# Patient Record
Sex: FEMALE | Race: WHITE | URBAN - METROPOLITAN AREA
[De-identification: names, ages, dates, MRNs, and addresses within clinical notes are randomized per-mention and may not be internally consistent; named-entity substitution may affect disease eponyms.]

---

## 2018-09-05 ENCOUNTER — OFFICE VISIT (OUTPATIENT)
Dept: FAMILY MEDICINE CLINIC | Age: 38
End: 2018-09-05

## 2018-09-05 VITALS
RESPIRATION RATE: 18 BRPM | HEART RATE: 67 BPM | WEIGHT: 188 LBS | DIASTOLIC BLOOD PRESSURE: 82 MMHG | SYSTOLIC BLOOD PRESSURE: 124 MMHG | TEMPERATURE: 98.9 F | HEIGHT: 71 IN | BODY MASS INDEX: 26.32 KG/M2 | OXYGEN SATURATION: 100 %

## 2018-09-05 DIAGNOSIS — G47.00 INSOMNIA, UNSPECIFIED TYPE: ICD-10-CM

## 2018-09-05 DIAGNOSIS — F90.9 ATTENTION DEFICIT HYPERACTIVITY DISORDER (ADHD), UNSPECIFIED ADHD TYPE: Primary | ICD-10-CM

## 2018-09-05 RX ORDER — CHOLECALCIFEROL (VITAMIN D3) 125 MCG
CAPSULE ORAL
COMMUNITY

## 2018-09-05 RX ORDER — DEXTROAMPHETAMINE SACCHARATE, AMPHETAMINE ASPARTATE, DEXTROAMPHETAMINE SULFATE AND AMPHETAMINE SULFATE 5; 5; 5; 5 MG/1; MG/1; MG/1; MG/1
20 TABLET ORAL 3 TIMES DAILY
Qty: 90 TAB | Refills: 0 | Status: SHIPPED | OUTPATIENT
Start: 2018-09-05 | End: 2018-10-04 | Stop reason: SDUPTHER

## 2018-09-05 NOTE — PROGRESS NOTES
HISTORY OF PRESENT ILLNESS  Corie Edmond is a 41 y.o. female. HPI  Corie Edmond is a 41 y.o. female who presents to the office today for med refills. She is a new patient. She comes in today initially to establish care, but says this office is too far from home. She has a long hx of ADD, anxiety, insomnia. Her last PCP was with [facility]. When she left, she felt like she was seeing a new provider each visit, and they would change her medication regimen each time. She eventually left and started seeing a psychiatrist in [facility]. He was treating her ADD with adderall 20mg TID. He was also giving her valium to take prior to each adderall dose. She has insomnia. She has tried many different medications for this: Nankin Pillar, sonata, trazodone, xanax, clonidine, seroquel, melatonin. Nothing seemed to help. She has been having sleeping issues as a child. Her sister tried to commit suicide when she was younger, and always attempted at night. So her mother used to tell her to keep an eye on her sister, and thus she never was able to sleep. She does drink energy drinks, especially when she does not have adderall. Today she is drinking a Monster energy drink in the room. She does not exercise. Chief Complaint   Patient presents with   [facility] Establish Care     Current Outpatient Prescriptions   Medication Sig    melatonin tab tablet Take  by mouth nightly.  dextroamphetamine-amphetamine (ADDERALL) 20 mg tablet Take 1 Tab (20 mg total) by mouth three (3) times dailyEarliest Fill Date: 9/5/18. Max Daily Amount: 60 mg    suvorexant (BELSOMRA) 10 mg tablet Take 1 tablet 30 minutes before bedtime. Max dose 10mg daily     No current facility-administered medications for this visit.         Not on File  Past Medical History:   Diagnosis Date    Depression     Headache     Knee joint pain      History   Smoking Status    Current Every Day Smoker    Packs/day: 1.00    Types: Cigarettes Smokeless Tobacco    Never Used     History   Alcohol Use No     Family History   Problem Relation Age of Onset    Depression Mother     Anxiety Mother     Hypertension Mother     Stroke Mother     Cancer Father      skin, prostate    Depression Sister     Alzheimer Maternal Grandmother     Stroke Maternal Grandmother     Cancer Other      Review of Systems   Constitutional: Negative for fever, malaise/fatigue and weight loss. Cardiovascular: Negative for chest pain and palpitations. Neurological: Negative for dizziness and headaches. Psychiatric/Behavioral: Negative for substance abuse. The patient is nervous/anxious and has insomnia. Visit Vitals    /82 (BP 1 Location: Left arm, BP Patient Position: Sitting)    Pulse 67    Temp 98.9 °F (37.2 °C) (Oral)    Resp 18    Ht 5' 11\" (1.803 m)    Wt 188 lb (85.3 kg)    LMP 08/13/2018    SpO2 100%    BMI 26.22 kg/m2     Physical Exam   Constitutional: She is oriented to person, place, and time. She appears well-developed and well-nourished. No distress. Cardiovascular: Normal rate, regular rhythm and normal heart sounds. Pulmonary/Chest: Effort normal and breath sounds normal. No respiratory distress. She has no wheezes. Neurological: She is alert and oriented to person, place, and time. Psychiatric: She has a normal mood and affect. Her speech is normal and behavior is normal. Thought content normal. She expresses no homicidal and no suicidal ideation. Nursing note and vitals reviewed. ASSESSMENT and PLAN    ICD-10-CM ICD-9-CM    1. Attention deficit hyperactivity disorder (ADHD), unspecified ADHD type F90.9 314.01 dextroamphetamine-amphetamine (ADDERALL) 20 mg tablet   2. Insomnia, unspecified type G47.00 780.52 suvorexant (BELSOMRA) 10 mg tablet      Reviewed . Last rx for Adderall 20mg tabs, #90 was filled on 8/9/2018. This has been prescribed consistently over the last year+. I will refill adderall as written. She will follow up with CHRISTAL Reeder. She would prefer the office in [place] which is closer for her. I am not refilling valium. Will try belsomra for insomnia- cost may be an issue since she is paying out of pocket. If this is too expensive, I can send over a rx for hydroxyzine which may help with her anxiety prior to bedtime. Reviewed medication and side effects. Patient agrees with the plan and verbalizes understanding. Follow-up Disposition:  Return in about 1 month (around 10/5/2018) for ADD and insomnia with new PCP.     Fady Honeycutt PA-C  9/5/2018

## 2018-09-05 NOTE — PROGRESS NOTES
Taniya Rojo is a 41 y.o. female presented in clinic today for   Chief Complaint   Patient presents with   [address]     1. Have you been to the ER, urgent care clinic since your last visit? Hospitalized since your last visit? No    2. Have you seen or consulted any other health care providers outside of the [address] [place] [address] [place] since your last visit? Include any pap smears or colon screening.  No

## 2018-09-05 NOTE — MR AVS SNAPSHOT
[address] [place] [address] [address] [address] 
[phone] Patient: Ken Jeong MRN: [id number] [id number] Visit Information Date & Time Provider Department Dept. Phone Encounter #  
 9/5/2018 11:00 AM Jovani King PA-C [employer] [phone] [id number] Follow-up Instructions Return for ADD and insomnia with new PCP. Upcoming Health Maintenance Date Due DTaP/Tdap/Td series (1 - Tdap) 7/7/2003 PAP AKA CERVICAL CYTOLOGY 7/7/2003 Influenza Age 5 to Adult 8/1/2018 Allergies as of 9/5/2018  Review Complete On: 9/5/2018 By: Jovani King PA-C Not on File Current Immunizations  Never Reviewed No immunizations on file. Not reviewed this visit You Were Diagnosed With   
  
 Codes Comments Attention deficit hyperactivity disorder (ADHD), unspecified ADHD type    -  Primary ICD-10-CM: F90.9 ICD-9-CM: 314.01 Insomnia, unspecified type     ICD-10-CM: G47.00 ICD-9-CM: 780.52 Vitals BP Pulse Temp Resp Height(growth percentile) Weight(growth percentile) 124/82 (BP 1 Location: Left arm, BP Patient Position: Sitting) 67 98.9 °F (37.2 °C) (Oral) 18 5' 11\" (1.803 m) 188 lb (85.3 kg) LMP SpO2 BMI OB Status Smoking Status 08/13/2018 100% 26.22 kg/m2 Having regular periods Current Every Day Smoker Vitals History BMI and BSA Data Body Mass Index Body Surface Area  
 26.22 kg/m 2 2.07 m 2 Your Updated Medication List  
  
   
This list is accurate as of 9/5/18 11:35 AM.  Always use your most recent med list.  
  
  
  
  
 dextroamphetamine-amphetamine 20 mg tablet Commonly known as:  ADDERALL Take 1 Tab (20 mg total) by mouth three (3) times dailyEarliest Fill Date: 9/5/18. Max Daily Amount: 60 mg  
  
 melatonin Tab tablet Take  by mouth nightly. suvorexant 10 mg tablet Commonly known as:  Jose Bates Take 1 tablet 30 minutes before bedtime. Max dose 10mg daily Prescriptions Printed Refills  
 dextroamphetamine-amphetamine (ADDERALL) 20 mg tablet 0 Sig: Take 1 Tab (20 mg total) by mouth three (3) times dailyEarliest Fill Date: 9/5/18. Max Daily Amount: 60 mg  
 Class: Print Route: Oral  
 suvorexant (BELSOMRA) 10 mg tablet 1 Sig: Take 1 tablet 30 minutes before bedtime. Max dose 10mg daily Class: Print Follow-up Instructions Return for ADD and insomnia with new PCP. Patient Instructions CHRISTAL Britton [address], [place], [address] Phone: [phone] Suvorexant (By mouth) Suvorexant (ddf-nly-PCG-ant) Treats insomnia. Brand Name(s): Belsomra There may be other brand names for this medicine. When This Medicine Should Not Be Used: This medicine is not right for everyone. Do not use it if you have narcolepsy. How to Use This Medicine:  
Tablet · Your doctor will tell you how much medicine to use. Do not use more than directed. · You should not take this medicine if you are not able to sleep or rest for at least 7 hours before you need to be active again. · This medicine works better if you do not take it with food or right after a meal. 
· This medicine should come with a Medication Guide. Ask your pharmacist for a copy if you do not have one. · Missed dose: This medicine is not taken on a regular schedule. Use it only when you cannot sleep. · Store the medicine in a closed container at room temperature, away from heat, moisture, and direct light. Do not remove the tablets from the blister pack until you are ready to use them. Drugs and Foods to Avoid: Ask your doctor or pharmacist before using any other medicine, including over-the-counter medicines, vitamins, and herbal products. · Some foods and medicines can affect how suvorexant works. Tell your doctor if you are using any of the following: ¨ Aprepitant, boceprevir, carbamazepine, ciprofloxacin, clarithromycin, conivaptan, digoxin, diltiazem, erythromycin, fluconazole, imatinib, nefazodone, phenytoin, rifampin, telaprevir, telithromycin, verapamil ¨ Medicine to treat HIV (such as amprenavir, atazanavir, fosamprenavir, indinavir, nelfinavir, ritonavir, saquinavir) or medicine to treat a fungal infection (such as itraconazole, ketoconazole, posaconazole) · Tell your doctor if you use anything else that makes you sleepy. Some examples are allergy medicine, narcotic pain medicine, and alcohol. · Do not eat grapefruit or drink grapefruit juice while you are using this medicine. Warnings While Using This Medicine: · Tell your doctor if you are pregnant or breastfeeding, or if you have liver disease, breathing or lung problems (such as COPD, sleep apnea), or muscle problems or weakness. Tell your doctor if you have a history of alcohol or drug addiction, depression, or mental illness. · This medicine may cause the following problems: ¨ Unusual changes in mood or behavior ¨ Sleep paralysis (while you are going to sleep or waking up) · This medicine may make you drowsy the next morning. Do not drive or do anything else that could be dangerous until you know how this medicine affects you. · This medicine may cause you to do things while you are still asleep, such as driving or eating. You may not remember doing these things the next morning. Tell your doctor right away if you learn that this has happened. · Call your doctor if you still have trouble sleeping after you take this medicine for 7 to 10 days. · This medicine can be habit-forming. Do not use more than your prescribed dose. Call your doctor if you think your medicine is not working. · Keep all medicine out of the reach of children. Never share your medicine with anyone. Possible Side Effects While Using This Medicine: Call your doctor right away if you notice any of these side effects: · Allergic reaction: Itching or hives, swelling in your face or hands, swelling or tingling in your mouth or throat, chest tightness, trouble breathing · Anxiety, depression, nervousness, unusual behavior, or thoughts of hurting yourself · Memory loss · Seeing, hearing, or feeling things that are not there · Severe confusion, drowsiness, muscle weakness · Temporary inability to move or talk while you are going to sleep or waking up If you notice these less serious side effects, talk with your doctor: · Daytime drowsiness If you notice other side effects that you think are caused by this medicine, tell your doctor. Call your doctor for medical advice about side effects. You may report side effects to FDA at 8-095-XMB-5122 © 2017 [facility] Information is for End User's use only and may not be sold, redistributed or otherwise used for commercial purposes. The above information is an  only. It is not intended as medical advice for individual conditions or treatments. Talk to your doctor, nurse or pharmacist before following any medical regimen to see if it is safe and effective for you. Introducing [facility]! Samaria Wang introduces [employer] patient portal. Now you can access parts of your medical record, email your doctor's office, and request medication refills online. 1. In your internet browser, go to [email or web address] 2. Click on the First Time User? Click Here link in the Sign In box. You will see the New Member Sign Up page. 3. Enter your [employer] Access Code exactly as it appears below. You will not need to use this code after youve completed the sign-up process. If you do not sign up before the expiration date, you must request a new code. · [employer] Access Code: [email or web address] Expires: 12/4/2018 11:35 AM 
 
 4. Enter the last four digits of your Social Security Number (xxxx) and Date of Birth (mm/dd/yyyy) as indicated and click Submit. You will be taken to the next sign-up page. 5. Create a [employer] ID. This will be your [employer] login ID and cannot be changed, so think of one that is secure and easy to remember. 6. Create a [employer] password. You can change your password at any time. 7. Enter your Password Reset Question and Answer. This can be used at a later time if you forget your password. 8. Enter your e-mail address. You will receive e-mail notification when new information is available in [address] 9. Click Sign Up. You can now view and download portions of your medical record. 10. Click the Download Summary menu link to download a portable copy of your medical information. If you have questions, please visit the Frequently Asked Questions section of the [employer] website. Remember, [employer] is NOT to be used for urgent needs. For medical emergencies, dial 911. Now available from your iPhone and Android! Please provide this summary of care documentation to your next provider. If you have any questions after today's visit, please call [phone].

## 2018-09-05 NOTE — PATIENT INSTRUCTIONS
CHRISTAL Simon  [address], [place], [address]  Phone: [phone]        Suvorexant (By mouth)   Suvorexant (czl-riy-HUN-ant)  Treats insomnia. Brand Name(s): Belsomra   There may be other brand names for this medicine. When This Medicine Should Not Be Used: This medicine is not right for everyone. Do not use it if you have narcolepsy. How to Use This Medicine:   Tablet  · Your doctor will tell you how much medicine to use. Do not use more than directed. · You should not take this medicine if you are not able to sleep or rest for at least 7 hours before you need to be active again. · This medicine works better if you do not take it with food or right after a meal.  · This medicine should come with a Medication Guide. Ask your pharmacist for a copy if you do not have one. · Missed dose: This medicine is not taken on a regular schedule. Use it only when you cannot sleep. · Store the medicine in a closed container at room temperature, away from heat, moisture, and direct light. Do not remove the tablets from the blister pack until you are ready to use them. Drugs and Foods to Avoid:   Ask your doctor or pharmacist before using any other medicine, including over-the-counter medicines, vitamins, and herbal products. · Some foods and medicines can affect how suvorexant works. Tell your doctor if you are using any of the following:  ¨ Aprepitant, boceprevir, carbamazepine, ciprofloxacin, clarithromycin, conivaptan, digoxin, diltiazem, erythromycin, fluconazole, imatinib, nefazodone, phenytoin, rifampin, telaprevir, telithromycin, verapamil  ¨ Medicine to treat HIV (such as amprenavir, atazanavir, fosamprenavir, indinavir, nelfinavir, ritonavir, saquinavir) or medicine to treat a fungal infection (such as itraconazole, ketoconazole, posaconazole)  · Tell your doctor if you use anything else that makes you sleepy.  Some examples are allergy medicine, narcotic pain medicine, and alcohol. · Do not eat grapefruit or drink grapefruit juice while you are using this medicine. Warnings While Using This Medicine:   · Tell your doctor if you are pregnant or breastfeeding, or if you have liver disease, breathing or lung problems (such as COPD, sleep apnea), or muscle problems or weakness. Tell your doctor if you have a history of alcohol or drug addiction, depression, or mental illness. · This medicine may cause the following problems:  ¨ Unusual changes in mood or behavior  ¨ Sleep paralysis (while you are going to sleep or waking up)  · This medicine may make you drowsy the next morning. Do not drive or do anything else that could be dangerous until you know how this medicine affects you. · This medicine may cause you to do things while you are still asleep, such as driving or eating. You may not remember doing these things the next morning. Tell your doctor right away if you learn that this has happened. · Call your doctor if you still have trouble sleeping after you take this medicine for 7 to 10 days. · This medicine can be habit-forming. Do not use more than your prescribed dose. Call your doctor if you think your medicine is not working. · Keep all medicine out of the reach of children. Never share your medicine with anyone.   Possible Side Effects While Using This Medicine:   Call your doctor right away if you notice any of these side effects:  · Allergic reaction: Itching or hives, swelling in your face or hands, swelling or tingling in your mouth or throat, chest tightness, trouble breathing  · Anxiety, depression, nervousness, unusual behavior, or thoughts of hurting yourself  · Memory loss  · Seeing, hearing, or feeling things that are not there  · Severe confusion, drowsiness, muscle weakness  · Temporary inability to move or talk while you are going to sleep or waking up  If you notice these less serious side effects, talk with your doctor:   · Daytime drowsiness  If you notice other side effects that you think are caused by this medicine, tell your doctor. Call your doctor for medical advice about side effects. You may report side effects to FDA at 5-077-KQO-6176  © 2017 [address] Information is for End User's use only and may not be sold, redistributed or otherwise used for commercial purposes. The above information is an  only. It is not intended as medical advice for individual conditions or treatments. Talk to your doctor, nurse or pharmacist before following any medical regimen to see if it is safe and effective for you.

## 2018-10-02 ENCOUNTER — DOCUMENTATION ONLY (OUTPATIENT)
Dept: FAMILY MEDICINE CLINIC | Age: 38
End: 2018-10-02

## 2018-10-02 NOTE — PROGRESS NOTES
Patient came in the office today wanting to know if she could be seen for a rolled ankle and a controlled substance refill. The patient came in to the office with a swollen, black and blue Right ankle and told her to have a seat instead of her putting weight on her foot. I had to speak loudly to the patient due to her being in the waiting room and away from my desk at the front. Advised the patient that her appointment today had to be cancled because Julien Rand had to leave early due to an illness. I left a message on her phone to reschedule at 9:14am, patient states that her nephew had her phone and he forwarded my call to Voice mail. I offered her an appointment with the NP for 2:45pm for her to be seen for her rolled ankle but the controlled substance would have to wait to be filled by Julien Rand when she is back in the office tomorrow. I also advised that is she did not want to wait to be seen she could go to another office closer to her home around the [facility]. Patient became irate when I advised her that she would need to been by her provider in order for the controlled substance to be filled. I provided multiple available times for her to be seen for a rolled ankle with another provider in the office. Patient did not want to schedule an multiple appointment and wanted to be seen by a provider in the office for both her rolled ankle and controlled substance. Patient ignored every avenue that I offered for her. Patient kept on stating that she did not understand why another provider could not fill her prescription for Adderall. I once [facility] her [facility]. \" \"She would have to be the one that would have to refill it for you. \"     Patient would not listen to anything that I was offering or suggesting to her so I ask TORI Blackman to step in and assist.

## 2018-10-03 NOTE — PROGRESS NOTES
The patient, Devon Bermudez : 84) was scheduled to see Doug Rodriges at 10:45am on 10/2/18. Sheila Nohemi fell ill earlier in the day and ended up leaving. All of [facility]s patients were either moved to [employer] schedule (if availability was present) or called and left messages to reschedule. Mrs. Mary munoz was called (at 9:14am) and a message was left about the appointment today. The patient arrived for her 10:45am appointment, stating to [facility] that her nephew had her phone so she did not get the message. The patient entered [facility] stating she injured her right ankle, but also needed to have a prescription of Adderall refilled. The patient initially became a New Patient to Doug Rodriges on 2018, where a 30 day prescription of Adderall was given to the patient. Since Sheila Nohemi was not present, the patient was given Nicole Castillo next available 30 appointment for her ankle, which was at 2:45pm. The patient was given other offices in the area that had availability as well if this did not work for her. We told the patient that Nicole could see her for her ankle issue, but Sheila Nohemi would need to issue the prescription. The patient was not understanding the situation and became irate stating we could not see her for her ankle, which was not the case. We gave her several options for the same day at our office and other offices, but she was more interested in the medication refill. Her medication is a controlled substance, and is not due to be refilled until . I told the patient we can see her for her ankle today, but Sheila Song would have to write the prescription for the medication on . The patient did not like that answer and left the building stating the  staff was unprofessional, rude, and need to work on their customer service.   The staff did everything they were supposed to do in this situation, and each of them remained calm throughout the situation.

## 2018-10-04 ENCOUNTER — OFFICE VISIT (OUTPATIENT)
Dept: FAMILY MEDICINE CLINIC | Age: 38
End: 2018-10-04

## 2018-10-04 VITALS
SYSTOLIC BLOOD PRESSURE: 126 MMHG | DIASTOLIC BLOOD PRESSURE: 96 MMHG | HEIGHT: 71 IN | OXYGEN SATURATION: 99 % | BODY MASS INDEX: 26.35 KG/M2 | WEIGHT: 188.2 LBS | TEMPERATURE: 98.2 F | HEART RATE: 63 BPM | RESPIRATION RATE: 12 BRPM

## 2018-10-04 DIAGNOSIS — F51.01 PRIMARY INSOMNIA: ICD-10-CM

## 2018-10-04 DIAGNOSIS — S93.491D SPRAIN OF ANTERIOR TALOFIBULAR LIGAMENT OF RIGHT ANKLE, SUBSEQUENT ENCOUNTER: ICD-10-CM

## 2018-10-04 DIAGNOSIS — F90.9 ATTENTION DEFICIT HYPERACTIVITY DISORDER (ADHD), UNSPECIFIED ADHD TYPE: Primary | ICD-10-CM

## 2018-10-04 RX ORDER — CLONIDINE HYDROCHLORIDE 0.1 MG/1
TABLET ORAL
COMMUNITY
Start: 2018-07-13 | End: 2018-10-04 | Stop reason: SDUPTHER

## 2018-10-04 RX ORDER — ALPRAZOLAM 0.5 MG/1
TABLET ORAL AS NEEDED
COMMUNITY
End: 2018-11-05

## 2018-10-04 RX ORDER — CLONIDINE HYDROCHLORIDE 0.1 MG/1
TABLET ORAL
Qty: 30 TAB | Refills: 2 | Status: SHIPPED | OUTPATIENT
Start: 2018-10-04 | End: 2018-11-05

## 2018-10-04 RX ORDER — DEXTROAMPHETAMINE SACCHARATE, AMPHETAMINE ASPARTATE, DEXTROAMPHETAMINE SULFATE AND AMPHETAMINE SULFATE 5; 5; 5; 5 MG/1; MG/1; MG/1; MG/1
20 TABLET ORAL 3 TIMES DAILY
Qty: 90 TAB | Refills: 0 | Status: SHIPPED | OUTPATIENT
Start: 2018-10-04 | End: 2018-11-05 | Stop reason: SDUPTHER

## 2018-10-04 NOTE — MR AVS SNAPSHOT
1017 [facility] 250 200 [facility] 
[phone] Patient: Elisabeth Ni MRN: [id number] Visit Information Date & Time Provider Department Dept. Phone Encounter #  
 10/4/2018  4:30 PM Kehinde Garcia, 933 [facility] [id number] Follow-up Instructions Return in about 1 month (around 11/4/2018). Your Appointments 11/5/2018  9:00 AM  
New Patient with CHRISTAL Whitley 20[facility] Appt Note: np- cpe - photo id, ins arrive [id number] [place] [address] 200 [facility]  
[address] Upcoming Health Maintenance Date Due Pneumococcal 19-64 Medium Risk (1 of 1 - PPSV23) 7/7/2001 DTaP/Tdap/Td series (1 - Tdap) 7/7/2003 PAP AKA CERVICAL CYTOLOGY 7/7/2003 Influenza Age 5 to Adult 10/4/2018* *Topic was postponed. The date shown is not the original due date. Allergies as of 10/4/2018  Review Complete On: 10/4/2018 By: CHRISTAL Whitley No Known Allergies Current Immunizations  Never Reviewed No immunizations on file. Not reviewed this visit You Were Diagnosed With   
  
 Codes Comments Attention deficit hyperactivity disorder (ADHD), unspecified ADHD type    -  Primary ICD-10-CM: F90.9 ICD-9-CM: 314.01 Primary insomnia     ICD-10-CM: F51.01 
ICD-9-CM: 307.42 Sprain of anterior talofibular ligament of right ankle, subsequent encounter     ICD-10-CM: K42.083D ICD-9-CM: V58.89, 845.09 Vitals BP Pulse Temp Resp Height(growth percentile) Weight(growth percentile) (!) 126/96 (BP 1 Location: Left arm, BP Patient Position: Sitting) 63 98.2 °F (36.8 °C) (Oral) 12 5' 11\" (1.803 m) 188 lb 3.2 oz (85.4 kg) LMP SpO2 BMI OB Status Smoking Status 09/30/2018 99% 26.25 kg/m2 Having regular periods Current Every Day Smoker Vitals History BMI and BSA Data Body Mass Index Body Surface Area  
 26.25 kg/m 2 2.07 m 2 Your Updated Medication List  
  
   
This list is accurate as of 10/4/18  5:28 PM.  Always use your most recent med list.  
  
  
  
  
 ALPRAZolam 0.5 mg tablet Commonly known as:  Dion Ebbing  
as needed. cloNIDine HCl 0.1 mg tablet Commonly known as:  CATAPRES  
TAKE 1 TAB BY MOUTH EVERY NIGHT AT BEDTIME AS NEEDED FOR INSOMNIA. dextroamphetamine-amphetamine 20 mg tablet Commonly known as:  ADDERALL Take 1 Tab (20 mg total) by mouth three (3) times daily. Max Daily Amount: 60 mg  
  
 melatonin Tab tablet Take  by mouth nightly. Prescriptions Printed Refills  
 dextroamphetamine-amphetamine (ADDERALL) 20 mg tablet 0 Sig: Take 1 Tab (20 mg total) by mouth three (3) times daily. Max Daily Amount: 60 mg  
 Class: Print Route: Oral  
 cloNIDine HCl (CATAPRES) 0.1 mg tablet 2 Sig: TAKE 1 TAB BY MOUTH EVERY NIGHT AT BEDTIME AS NEEDED FOR INSOMNIA. Class: Print We Performed the Following REFERRAL TO SLEEP STUDIES [REF99 Custom] Comments:  
 Please refer to Dr. Gloria Jackson for sleep evaluation. Patient with severe refractory insomnia, daytime sleepiness. Follow-up Instructions Return in about 1 month (around 11/4/2018). Referral Information Referral ID Referred By Referred To  
  
 [id number] Miguel Angel [address], [address] [place] [address] Phone: [phone] Fax: [phone] Visits Status Start Date End Date 1 Authorized 10/4/18 10/4/19 If your referral has a status of pending review or denied, additional information will be sent to support the outcome of this decision. Patient Instructions A Healthy Lifestyle: Care Instructions Your Care Instructions A healthy lifestyle can help you feel good, stay at a healthy weight, and have plenty of energy for both work and play. A healthy lifestyle is something you can share with your whole family. A healthy lifestyle also can lower your risk for serious health problems, such as high blood pressure, heart disease, and diabetes. You can follow a few steps listed below to improve your health and the health of your family. Follow-up care is a key part of your treatment and safety. Be sure to make and go to all appointments, and call your doctor if you are having problems. It's also a good idea to know your test results and keep a list of the medicines you take. How can you care for yourself at home? · Do not eat too much sugar, fat, or fast foods. You can still have dessert and treats now and then. The goal is moderation. · Start small to improve your eating habits. Pay attention to portion sizes, drink less juice and soda pop, and eat more fruits and vegetables. ¨ Eat a healthy amount of food. A 3-ounce serving of meat, for example, is about the size of a deck of cards. Fill the rest of your plate with vegetables and whole grains. ¨ Limit the amount of soda and sports drinks you have every day. Drink more water when you are thirsty. ¨ Eat at least 5 servings of fruits and vegetables every day. It may seem like a lot, but it is not hard to reach this goal. A serving or helping is 1 piece of fruit, 1 cup of vegetables, or 2 cups of leafy, raw vegetables. Have an apple or some carrot sticks as an afternoon snack instead of a candy bar. Try to have fruits and/or vegetables at every meal. 
· Make exercise part of your daily routine. You may want to start with simple activities, such as walking, bicycling, or slow swimming. Try to be active 30 to 60 minutes every day. You do not need to do all 30 to 60 minutes all at once.  For example, you can exercise 3 times a day for 10 or 20 minutes. Moderate exercise is safe for most people, but it is always a good idea to talk to your doctor before starting an exercise program. 
· Keep moving. Maikol Cruz the lawn, work in the garden, or [employer]. Take the stairs instead of the elevator at work. · If you smoke, quit. People who smoke have an increased risk for heart attack, stroke, cancer, and other lung illnesses. Quitting is hard, but there are ways to boost your chance of quitting tobacco for good. ¨ Use nicotine gum, patches, or lozenges. ¨ Ask your doctor about stop-smoking programs and medicines. ¨ Keep trying. In addition to reducing your risk of diseases in the future, you will notice some benefits soon after you stop using tobacco. If you have shortness of breath or asthma symptoms, they will likely get better within a few weeks after you quit. · Limit how much alcohol you drink. Moderate amounts of alcohol (up to 2 drinks a day for men, 1 drink a day for women) are okay. But drinking too much can lead to liver problems, high blood pressure, and other health problems. Family health If you have a family, there are many things you can do together to improve your health. · Eat meals together as a family as often as possible. · Eat healthy foods. This includes fruits, vegetables, lean meats and dairy, and whole grains. · Include your family in your fitness plan. Most people think of activities such as jogging or tennis as the way to fitness, but there are many ways you and your family can be more active. Anything that makes you breathe hard and gets your heart pumping is exercise. Here are some tips: 
¨ Walk to do errands or to take your child to school or the bus. ¨ Go for a family bike ride after dinner instead of watching TV. Where can you learn more? Go to http://mai-marlena.info/. Enter B247 in the search box to learn more about \"A Healthy Lifestyle: Care Instructions. \" Current as of: December 7, 2017 Content Version: 11.8 © 3705-5170 Healthwise, [employer]. Care instructions adapted under license by [employer] (which disclaims liability or warranty for this information). If you have questions about a medical condition or this instruction, always ask your healthcare professional. [address] any warranty or liability for your use of this information. Introducing [facility]! Dear Amy Hatfield: Thank you for requesting a [employer] account. Our records indicate that you already have an active [employer] account. You can access your account anytime at [email or web address] Did you know that you can access your hospital and ER discharge instructions at any time in [employer] Hero? You can also review all of your test results from your hospital stay or ER visit. Additional Information If you have questions, please visit the Frequently Asked Questions section of the [employer] Hero website at [email or web address] Remember, [employer] Hero is NOT to be used for urgent needs. For medical emergencies, dial 911. Now available from your iPhone and Android! Please provide this summary of care documentation to your next provider. If you have any questions after today's visit, please call [phone].

## 2018-10-04 NOTE — PROGRESS NOTES
Patient: Narda Capone MRN: [id number]  SSN: [id number]    YOB: 1980  Age: 41 y.o. Sex: female      Date of Service: 10/4/2018   Provider: CHRISTAL Minor   Office Location:   [address] [place]on pérez, [address]  Office Phone: [phone]  Office Fax: [phone]        REASON FOR VISIT:   Chief Complaint   Patient presents with    Establish Care    Behavioral Problem    Insomnia    Anxiety    Foot Pain     Sprain, [facility] on 10/02/2018, [address]    Arthritis     in right knee    Headache        VITALS:   Visit Vitals    BP (!) 126/96 (BP 1 Location: Left arm, BP Patient Position: Sitting)  Comment: manual    Pulse 63    Temp 98.2 °F (36.8 °C) (Oral)    Resp 12    Ht 5' 11\" (1.803 m)    Wt 188 lb 3.2 oz (85.4 kg)    LMP 09/30/2018    SpO2 99%    BMI 26.25 kg/m2       MEDICATIONS:   Current Outpatient Prescriptions   Medication Sig Dispense Refill    ALPRAZolam (XANAX) 0.5 mg tablet as needed.  melatonin tab tablet Take  by mouth nightly.  dextroamphetamine-amphetamine (ADDERALL) 20 mg tablet Take 1 Tab (20 mg total) by mouth three (3) times dailyEarliest Fill Date: 9/5/18. Max Daily Amount: 60 mg 90 Tab 0    suvorexant (BELSOMRA) 10 mg tablet Take 1 tablet 30 minutes before bedtime.  Max dose 10mg daily 30 Tab 1        ALLERGIES:   No Known Allergies     MEDICAL/SURGICAL HISTORY:  Past Medical History:   Diagnosis Date    Depression     Headache     Knee joint pain       Past Surgical History:   Procedure Laterality Date    HX BREAST REDUCTION Bilateral         FAMILY HISTORY:  Family History   Problem Relation Age of Onset    Depression Mother     Anxiety Mother     Hypertension Mother     Stroke Mother     Cancer Father      skin, prostate    Depression Sister     Alzheimer Maternal Grandmother     Stroke Maternal Grandmother     Cancer Other         SOCIAL HISTORY:  Social History Substance Use Topics    Smoking status: Current Every Day Smoker     Packs/day: 1.00     Types: Cigarettes    Smokeless tobacco: Never Used    Alcohol use No            HISTORY OF PRESENT ILLNESS:   Tra Sanchez is a 41 y.o. female who presents to the office to establish care as a new patient. She is transferring her care from [place], [place] at [facility] for a more convenient office location. She was previously followed by [facility]. ADHD -  Since adolescence. Patient reports symptoms have been well controlled on Adderall 20 mg TID. She had recently been following with a psychiatrist in [place] for med management, but it eventually became too expensive, as she is self-pay. She feels this dose is effective and denies adverse side effects. Insomnia -   Patient's main concern today is insomnia. She reports she has struggled with sleep for as long as she can remember. She attributes some of this to the fact that her sister, who has mental health problems, used to cut and make suicide attempts at night. Patient used to try to stay awake to keep an eye on her sister. Even though they are now grown, and her sister is doing well, Jennifer continues to feel somewhat anxious at bedtime. She has been through extensive therapy and has tried numerous medications in the past. Some brought her temporary relief, but she ultimately built tolerance. Meds that she can recall taking are as follows: Ambien, Lunesta, trazodone, clonidine, Seroquel, melatonin, L-thianine, and Valium. Most recently, her PCP prescribed Belsomra. She was able to get a 30 day supply for $30 but fears it is going to be cost-prohibitive going forward. It also has not been helpful so far. She does recall having a sleep study back in 2011 at a [facility] facility in [place].  She knows she was told she does not have sleep apnea, but is not entirely sure what else was tested (unknown if MSLT done)  She does say she was told she spends very little time in REM sleep. Interestingly, she does nap easily during the day. She is well-versed on sleep hygiene and tries to follow these recommendations as best she can. She does need to have the TV on at night in order to fall asleep, due to anxiety. Ankle Sprain -  Finally, patient reports that she slipped down the stairs and rolled her ankle earlier this week. She was seen at [facility] on [address] [facility] where x-rays were performed. She was told there was no fracture, and that likely it was just a bad sprain. She is able to bear weight, but it is painful. She has been ambulating with crutches, but finds it to be cumbersome. Her mother is a physical therapist and has advised RICE. Patient is using an ACE wrap right now, but is hopeful that her mom will be able to bring a CAM boot home from work for her. REVIEW OF SYSTEMS:  ROS (see scanned H&P form for complete 12 point ROS)    PHYSICAL EXAMINATION:  Physical Exam   Constitutional: She is oriented to person, place, and time and well-developed, well-nourished, and in no distress. Musculoskeletal:        Right ankle: She exhibits decreased range of motion, swelling and ecchymosis ( lateral foot and ankle). She exhibits no deformity and normal pulse. Tenderness. Lateral malleolus and AITFL tenderness found. No medial malleolus, no CF ligament, no posterior TFL, no head of 5th metatarsal and no proximal fibula tenderness found. Achilles tendon exhibits no pain. Neurological: She is alert and oriented to person, place, and time. Gait normal.   Skin: Skin is warm and dry. No rash noted. Psychiatric: Mood, memory and affect normal.        RESULTS:  No results found for this visit on 10/04/18. ASSESSMENT/PLAN:  Diagnoses and all orders for this visit:    1.  Attention deficit hyperactivity disorder (ADHD), unspecified ADHD type  - Stable on Adderall  -  reviewed, no inconsistencies   - CMA signed Orders:    -     dextroamphetamine-amphetamine (ADDERALL) 20 mg tablet; Take 1 Tab (20 mg total) by mouth three (3) times daily. Max Daily Amount: 60 mg    2. Primary insomnia  - Will start her back on clonidine, as this has at least provided some relief in the past, and should be relatively inexpensive at a cash-pay price.   - I do think there is a large anxiety/PTSD component to her sleep difficulties, given her traumatic past in dealing with her sister. She has been through CBT for this in the past.  - I also wonder if she may benefit from additional sleep testing, MSLT if not performed in 2011? Will refer to neuro sleep specialist for consultation and additional recommendations  Orders:    -     cloNIDine HCl (CATAPRES) 0.1 mg tablet; TAKE 1 TAB BY MOUTH EVERY NIGHT AT BEDTIME AS NEEDED FOR INSOMNIA.  -     REFERRAL TO SLEEP STUDIES    3. Sprain of anterior talofibular ligament of right ankle, subsequent encounter  - Extensive swelling and bruising. Tender over ATFL, but also with some lateral malleolus tenderness, concerning for possible occult fx. Will obtain records from [facility]. Discussed that we may want to consider follow up imaging if she's not noticing some steady improvement by the early part of next week. Follow-up Disposition:  Return in about 1 month (around 11/4/2018). A total of 30 minutes was spent with the patient, of which >50% was spent in counseling/coordinating care regarding sleep disturbances, treatment and workup.         PATIENT CARE TEAM:   No care team member to display       CHRISTAL Anderson   October 5, 2018   12:38 PM

## 2018-10-04 NOTE — PROGRESS NOTES
Chief Complaint   Patient presents with   [facility] Establish Care    Behavioral Problem    Insomnia    Anxiety    Foot Pain     Sprain, [facility] on 10/02/2018, [address] Arthritis     in right knee    Headache     Patient is not fasting. Patient in room # 5.     1. Have you been to the ER, urgent care clinic since your last visit? Hospitalized since your last visit? Yes When: 10/02/2018 Where: [facility] Reason for visit: Foot Pain    2. Have you seen or consulted any other health care providers outside of the [address] [place] [address] since your last visit? Include any pap smears or colon screening. No     Reviewed.  Pap 2017, Aubrey Ortiz, Flu declined

## 2018-10-04 NOTE — PATIENT INSTRUCTIONS

## 2018-11-05 ENCOUNTER — OFFICE VISIT (OUTPATIENT)
Dept: FAMILY MEDICINE CLINIC | Age: 38
End: 2018-11-05

## 2018-11-05 VITALS
SYSTOLIC BLOOD PRESSURE: 108 MMHG | TEMPERATURE: 98 F | OXYGEN SATURATION: 98 % | HEIGHT: 71 IN | WEIGHT: 185.5 LBS | DIASTOLIC BLOOD PRESSURE: 80 MMHG | RESPIRATION RATE: 12 BRPM | HEART RATE: 70 BPM | BODY MASS INDEX: 25.97 KG/M2

## 2018-11-05 DIAGNOSIS — F90.9 ATTENTION DEFICIT HYPERACTIVITY DISORDER (ADHD), UNSPECIFIED ADHD TYPE: Primary | ICD-10-CM

## 2018-11-05 DIAGNOSIS — M25.571 ACUTE RIGHT ANKLE PAIN: ICD-10-CM

## 2018-11-05 DIAGNOSIS — F51.01 PRIMARY INSOMNIA: ICD-10-CM

## 2018-11-05 RX ORDER — DEXTROAMPHETAMINE SACCHARATE, AMPHETAMINE ASPARTATE, DEXTROAMPHETAMINE SULFATE AND AMPHETAMINE SULFATE 5; 5; 5; 5 MG/1; MG/1; MG/1; MG/1
20 TABLET ORAL 3 TIMES DAILY
Qty: 90 TAB | Refills: 0 | Status: SHIPPED | OUTPATIENT
Start: 2018-11-05 | End: 2018-11-05 | Stop reason: SDUPTHER

## 2018-11-05 RX ORDER — DEXTROAMPHETAMINE SACCHARATE, AMPHETAMINE ASPARTATE, DEXTROAMPHETAMINE SULFATE AND AMPHETAMINE SULFATE 5; 5; 5; 5 MG/1; MG/1; MG/1; MG/1
20 TABLET ORAL 3 TIMES DAILY
Qty: 90 TAB | Refills: 0 | Status: SHIPPED | OUTPATIENT
Start: 2019-01-05 | End: 2019-02-07 | Stop reason: SDUPTHER

## 2018-11-05 RX ORDER — DEXTROAMPHETAMINE SACCHARATE, AMPHETAMINE ASPARTATE, DEXTROAMPHETAMINE SULFATE AND AMPHETAMINE SULFATE 5; 5; 5; 5 MG/1; MG/1; MG/1; MG/1
20 TABLET ORAL 3 TIMES DAILY
Qty: 90 TAB | Refills: 0 | Status: SHIPPED | OUTPATIENT
Start: 2018-12-05 | End: 2018-11-05 | Stop reason: SDUPTHER

## 2018-11-05 NOTE — PROGRESS NOTES
Patient: Maxwell Patel MRN: [id number]  SSN: [id number] YOB: 1980  Age: 41 y.o. Sex: female Date of Service: 11/5/2018 Provider: CHRISTAL Rodríguez Office Location:  
[place] [address], [address] [place] [address]rez, [address] Office Phone: [phone] Office Fax: [phone] REASON FOR VISIT:  
Chief Complaint Patient presents with  Behavioral Problem ADHD  Insomnia  
  follow-up VITALS:  
Visit Vitals /80 (BP 1 Location: Right arm, BP Patient Position: Sitting) Comment: manual  
Pulse 70 Temp 98 °F (36.7 °C) (Oral) Resp 12 Ht 5' 11\" (1.803 m) Wt 185 lb 8 oz (84.1 kg) LMP 10/25/2018 SpO2 98% BMI 25.87 kg/m² MEDICATIONS:  
Current Outpatient Medications on File Prior to Visit Medication Sig Dispense Refill  dextroamphetamine-amphetamine (ADDERALL) 20 mg tablet Take 1 Tab (20 mg total) by mouth three (3) times daily. Max Daily Amount: 60 mg 90 Tab 0  cloNIDine HCl (CATAPRES) 0.1 mg tablet TAKE 1 TAB BY MOUTH EVERY NIGHT AT BEDTIME AS NEEDED FOR INSOMNIA. 30 Tab 2  
 melatonin tab tablet Take  by mouth nightly. No current facility-administered medications on file prior to visit. ALLERGIES:  
No Known Allergies MEDICAL/SURGICAL HISTORY: 
Past Medical History:  
Diagnosis Date  Depression  Headache  Knee joint pain Past Surgical History:  
Procedure Laterality Date  HX BREAST REDUCTION Bilateral   
  
 
FAMILY HISTORY: 
Family History Problem Relation Age of Onset  Depression Mother  Anxiety Mother  Hypertension Mother  Stroke Mother  Cancer Father   
     skin, prostate  Depression Sister  Alzheimer Maternal Grandmother  Stroke Maternal Grandmother  Cancer Other SOCIAL HISTORY: 
Social History Tobacco Use  Smoking status: Current Every Day Smoker Packs/day: 1.00 Types: Cigarettes  Smokeless tobacco: Never Used Substance Use Topics  Alcohol use: No  
 Drug use: No  
   
 
 
 
HISTORY OF PRESENT ILLNESS: Colonel Dalton is a 41 y.o. female who presents to the office for a routine follow up visit. ADHD - Since adolescence. Patient reports symptoms have been well controlled on Adderall 20 mg TID. She had recently been following with a psychiatrist in [facility] for med management, but it eventually became too expensive, as she is self-pay. She feels this dose is effective and denies adverse side effects. Insomnia - Patient was referred to sleep medicine at her last visit, but appointment is not yet scheduled. She is still interested in following up with sleep specialist.  
 
R ankle Pain -  
Patient complains of persistent R lateral ankle pain and swelling s/p injury about a month ago. She was seen at [facility] on [address] [place] [address] where x-rays were performed. She was told there was no fracture, and that likely it was just a bad sprain. We had discussed doing some repeat imaging if pain was not steadily improving, to r/o occult fx. She did return to [facility] recently. She was not re-imaged but was given a walking boot to wear. She states the boot is very uncomfortable and painful, but she is not sure how to proceed as her symptoms have not improved as much as expected. REVIEW OF SYSTEMS: 
ROS PHYSICAL EXAMINATION: 
Physical Exam  
 
RESULTS: 
No results found for this visit on 11/05/18. ASSESSMENT/PLAN: 
Diagnoses and all orders for this visit: 1. Attention deficit hyperactivity disorder (ADHD), unspecified ADHD type - Stable on current regimen -  without red flags 
- meds refilled x 3 mo Orders:   
-     dextroamphetamine-amphetamine (ADDERALL) 20 mg tablet; Take 1 Tab (20 mg total) by mouth three (3) times dailyEarliest Fill Date: 1/5/19. Max Daily Amount: 60 mg 2. Primary insomnia - Provided with contact info for Dr. Yvrose Calhoun - Encouraged patient to discuss the detailed sleep history that she had given me at her last visit with the sleep specialist  
 
3. Acute right ankle pain - Repeat x ray Orders:   
-     XR ANKLE RT MIN 3 V; Future 4. BMI 25.0-25.9,adult 
- Healthy lifestyle handout included in AVS  
 
 
Follow-up Disposition: 
Return in about 3 months (around 2/5/2019) for ADHD. All questions answered. Patient expresses understanding and agrees with the plan as detailed above. PATIENT CARE TEAM:  
Patient Care Team: CHRISTAL Santos as PCP - General (Physician Assistant) CHRISTAL Alonso November 5, 2018 10:10 AM

## 2018-11-05 NOTE — PATIENT INSTRUCTIONS
A Healthy Lifestyle: Care Instructions Your Care Instructions A healthy lifestyle can help you feel good, stay at a healthy weight, and have plenty of energy for both work and play. A healthy lifestyle is something you can share with your whole family. A healthy lifestyle also can lower your risk for serious health problems, such as high blood pressure, heart disease, and diabetes. You can follow a few steps listed below to improve your health and the health of your family. Follow-up care is a key part of your treatment and safety. Be sure to make and go to all appointments, and call your doctor if you are having problems. It's also a good idea to know your test results and keep a list of the medicines you take. How can you care for yourself at home? · Do not eat too much sugar, fat, or fast foods. You can still have dessert and treats now and then. The goal is moderation. · Start small to improve your eating habits. Pay attention to portion sizes, drink less juice and soda pop, and eat more fruits and vegetables. ? Eat a healthy amount of food. A 3-ounce serving of meat, for example, is about the size of a deck of cards. Fill the rest of your plate with vegetables and whole grains. ? Limit the amount of soda and sports drinks you have every day. Drink more water when you are thirsty. ? Eat at least 5 servings of fruits and vegetables every day. It may seem like a lot, but it is not hard to reach this goal. A serving or helping is 1 piece of fruit, 1 cup of vegetables, or 2 cups of leafy, raw vegetables. Have an apple or some carrot sticks as an afternoon snack instead of a candy bar. Try to have fruits and/or vegetables at every meal. 
· Make exercise part of your daily routine. You may want to start with simple activities, such as walking, bicycling, or slow swimming. Try to be active 30 to 60 minutes every day.  You do not need to do all 30 to 60 minutes all at once. For example, you can exercise 3 times a day for 10 or 20 minutes. Moderate exercise is safe for most people, but it is always a good idea to talk to your doctor before starting an exercise program. 
· Keep moving. Dario Memos the lawn, work in the garden, or Five9. Take the stairs instead of the elevator at work. · If you smoke, quit. People who smoke have an increased risk for heart attack, stroke, cancer, and other lung illnesses. Quitting is hard, but there are ways to boost your chance of quitting tobacco for good. ? Use nicotine gum, patches, or lozenges. ? Ask your doctor about stop-smoking programs and medicines. ? Keep trying. In addition to reducing your risk of diseases in the future, you will notice some benefits soon after you stop using tobacco. If you have shortness of breath or asthma symptoms, they will likely get better within a few weeks after you quit. · Limit how much alcohol you drink. Moderate amounts of alcohol (up to 2 drinks a day for men, 1 drink a day for women) are okay. But drinking too much can lead to liver problems, high blood pressure, and other health problems. Family health If you have a family, there are many things you can do together to improve your health. · Eat meals together as a family as often as possible. · Eat healthy foods. This includes fruits, vegetables, lean meats and dairy, and whole grains. · Include your family in your fitness plan. Most people think of activities such as jogging or tennis as the way to fitness, but there are many ways you and your family can be more active. Anything that makes you breathe hard and gets your heart pumping is exercise. Here are some tips: 
? Walk to do errands or to take your child to school or the bus. 
? Go for a family bike ride after dinner instead of watching TV. Where can you learn more? Go to http://mai-marlena.info/. Enter I070 in the search box to learn more about \"A Healthy Lifestyle: Care Instructions. \" Current as of: December 7, 2017 Content Version: 11.8 © 6131-7271 Healthwise, "[employer]. Care instructions adapted under license by [employer] (which disclaims liability or warranty for this information). If you have questions about a medical condition or this instruction, always ask your healthcare professional. [place] [address] any warranty or liability for your use of this information.

## 2018-11-05 NOTE — PROGRESS NOTES
Chief Complaint Patient presents with  Behavioral Problem ADHD  Insomnia  
  follow-up Patient is not fasting. Patient in room # 6. Patient would like to discuss ankle pain. 1. Have you been to the ER, urgent care clinic since your last visit? Hospitalized since your last visit? Yes When: 10/2018 [facility] Reason for visit: Follow-up/Ankle pain 2. Have you seen or consulted any other health care providers outside of the [address] [place] [address] since your last visit? Include any pap smears or colon screening. No 
 
 Reviewed. Flu declined

## 2018-11-30 RX ORDER — QUETIAPINE FUMARATE 50 MG/1
TABLET, FILM COATED ORAL
Qty: 60 TAB | Refills: 0 | Status: SHIPPED | OUTPATIENT
Start: 2018-11-30 | End: 2019-01-02 | Stop reason: SDUPTHER

## 2018-11-30 RX ORDER — HYDROXYZINE 50 MG/1
TABLET, FILM COATED ORAL
Qty: 30 TAB | Refills: 0 | Status: SHIPPED | OUTPATIENT
Start: 2018-11-30 | End: 2018-11-30

## 2019-01-02 RX ORDER — QUETIAPINE FUMARATE 50 MG/1
TABLET, FILM COATED ORAL
Qty: 60 TAB | Refills: 0 | Status: CANCELLED | OUTPATIENT
Start: 2019-01-02

## 2019-01-03 RX ORDER — QUETIAPINE FUMARATE 50 MG/1
TABLET, FILM COATED ORAL
Qty: 60 TAB | Refills: 0 | Status: CANCELLED | OUTPATIENT
Start: 2019-01-03

## 2019-01-03 NOTE — TELEPHONE ENCOUNTER
Refill request for Seroquel 50mg. Last refill on 11/30/2018 #60 w [facility]. Last office visit 11/05/2018. Next appointment on 02/13/2019. Requested pharmacy [facility].  Refill request sent to provider for approval or denial.

## 2019-01-03 NOTE — TELEPHONE ENCOUNTER
[facility].... maybe [facility] with [facility]? [place] [address]

## 2019-01-04 RX ORDER — QUETIAPINE FUMARATE 50 MG/1
TABLET, FILM COATED ORAL
Qty: 60 TAB | Refills: 0 | Status: SHIPPED | OUTPATIENT
Start: 2019-01-04 | End: 2019-02-07 | Stop reason: SINTOL

## 2019-01-04 NOTE — TELEPHONE ENCOUNTER
Called home number. Left message on answering machine to return the call. This call was concerning message below per Kathy SIEGEL.

## 2019-02-07 ENCOUNTER — OFFICE VISIT (OUTPATIENT)
Dept: FAMILY MEDICINE CLINIC | Age: 39
End: 2019-02-07

## 2019-02-07 ENCOUNTER — TELEPHONE (OUTPATIENT)
Dept: FAMILY MEDICINE CLINIC | Age: 39
End: 2019-02-07

## 2019-02-07 VITALS
HEIGHT: 71 IN | TEMPERATURE: 98.7 F | SYSTOLIC BLOOD PRESSURE: 122 MMHG | HEART RATE: 65 BPM | WEIGHT: 191.8 LBS | DIASTOLIC BLOOD PRESSURE: 78 MMHG | RESPIRATION RATE: 12 BRPM | BODY MASS INDEX: 26.85 KG/M2 | OXYGEN SATURATION: 95 %

## 2019-02-07 DIAGNOSIS — F33.9 RECURRENT DEPRESSION (HCC): ICD-10-CM

## 2019-02-07 DIAGNOSIS — Z72.0 TOBACCO ABUSE: ICD-10-CM

## 2019-02-07 DIAGNOSIS — F90.9 ATTENTION DEFICIT HYPERACTIVITY DISORDER (ADHD), UNSPECIFIED ADHD TYPE: Primary | ICD-10-CM

## 2019-02-07 DIAGNOSIS — F90.9 ATTENTION DEFICIT HYPERACTIVITY DISORDER (ADHD), UNSPECIFIED ADHD TYPE: ICD-10-CM

## 2019-02-07 DIAGNOSIS — F51.01 PRIMARY INSOMNIA: ICD-10-CM

## 2019-02-07 RX ORDER — DEXTROAMPHETAMINE SACCHARATE, AMPHETAMINE ASPARTATE, DEXTROAMPHETAMINE SULFATE AND AMPHETAMINE SULFATE 5; 5; 5; 5 MG/1; MG/1; MG/1; MG/1
20 TABLET ORAL 3 TIMES DAILY
Qty: 90 TAB | Refills: 0 | Status: SHIPPED | OUTPATIENT
Start: 2019-04-07 | End: 2019-04-19 | Stop reason: DRUGHIGH

## 2019-02-07 RX ORDER — DEXTROAMPHETAMINE SACCHARATE, AMPHETAMINE ASPARTATE, DEXTROAMPHETAMINE SULFATE AND AMPHETAMINE SULFATE 5; 5; 5; 5 MG/1; MG/1; MG/1; MG/1
20 TABLET ORAL 3 TIMES DAILY
Qty: 90 TAB | Refills: 0 | Status: CANCELLED | OUTPATIENT
Start: 2019-02-07

## 2019-02-07 RX ORDER — DEXTROAMPHETAMINE SACCHARATE, AMPHETAMINE ASPARTATE, DEXTROAMPHETAMINE SULFATE AND AMPHETAMINE SULFATE 5; 5; 5; 5 MG/1; MG/1; MG/1; MG/1
20 TABLET ORAL 3 TIMES DAILY
Qty: 90 TAB | Refills: 0 | Status: SHIPPED | OUTPATIENT
Start: 2019-03-07 | End: 2019-02-07 | Stop reason: SDUPTHER

## 2019-02-07 RX ORDER — BUPROPION HYDROCHLORIDE 150 MG/1
150 TABLET, EXTENDED RELEASE ORAL 2 TIMES DAILY
Qty: 60 TAB | Refills: 0 | Status: SHIPPED | OUTPATIENT
Start: 2019-02-07 | End: 2019-03-10 | Stop reason: SDUPTHER

## 2019-02-07 RX ORDER — DEXTROAMPHETAMINE SACCHARATE, AMPHETAMINE ASPARTATE, DEXTROAMPHETAMINE SULFATE AND AMPHETAMINE SULFATE 5; 5; 5; 5 MG/1; MG/1; MG/1; MG/1
20 TABLET ORAL 3 TIMES DAILY
Qty: 90 TAB | Refills: 0 | OUTPATIENT
Start: 2019-02-07

## 2019-02-07 RX ORDER — DEXTROAMPHETAMINE SACCHARATE, AMPHETAMINE ASPARTATE, DEXTROAMPHETAMINE SULFATE AND AMPHETAMINE SULFATE 5; 5; 5; 5 MG/1; MG/1; MG/1; MG/1
20 TABLET ORAL 3 TIMES DAILY
Qty: 90 TAB | Refills: 0 | Status: SHIPPED | OUTPATIENT
Start: 2019-02-07 | End: 2019-02-07 | Stop reason: SDUPTHER

## 2019-02-07 NOTE — PATIENT INSTRUCTIONS
A Healthy Lifestyle: Care Instructions  Your Care Instructions    A healthy lifestyle can help you feel good, stay at a healthy weight, and have plenty of energy for both work and play. A healthy lifestyle is something you can share with your whole family. A healthy lifestyle also can lower your risk for serious health problems, such as high blood pressure, heart disease, and diabetes. You can follow a few steps listed below to improve your health and the health of your family. Follow-up care is a key part of your treatment and safety. Be sure to make and go to all appointments, and call your doctor if you are having problems. It's also a good idea to know your test results and keep a list of the medicines you take. How can you care for yourself at home? · Do not eat too much sugar, fat, or fast foods. You can still have dessert and treats now and then. The goal is moderation. · Start small to improve your eating habits. Pay attention to portion sizes, drink less juice and soda pop, and eat more fruits and vegetables. ? Eat a healthy amount of food. A 3-ounce serving of meat, for example, is about the size of a deck of cards. Fill the rest of your plate with vegetables and whole grains. ? Limit the amount of soda and sports drinks you have every day. Drink more water when you are thirsty. ? Eat at least 5 servings of fruits and vegetables every day. It may seem like a lot, but it is not hard to reach this goal. A serving or helping is 1 piece of fruit, 1 cup of vegetables, or 2 cups of leafy, raw vegetables. Have an apple or some carrot sticks as an afternoon snack instead of a candy bar. Try to have fruits and/or vegetables at every meal.  · Make exercise part of your daily routine. You may want to start with simple activities, such as walking, bicycling, or slow swimming. Try to be active 30 to 60 minutes every day. You do not need to do all 30 to 60 minutes all at once.  For example, you can exercise 3 times a day for 10 or 20 minutes. Moderate exercise is safe for most people, but it is always a good idea to talk to your doctor before starting an exercise program.  · Keep moving. Ramez Cue the lawn, work in the garden, or [place]. Take the stairs instead of the elevator at work. · If you smoke, quit. People who smoke have an increased risk for heart attack, stroke, cancer, and other lung illnesses. Quitting is hard, but there are ways to boost your chance of quitting tobacco for good. ? Use nicotine gum, patches, or lozenges. ? Ask your doctor about stop-smoking programs and medicines. ? Keep trying. In addition to reducing your risk of diseases in the future, you will notice some benefits soon after you stop using tobacco. If you have shortness of breath or asthma symptoms, they will likely get better within a few weeks after you quit. · Limit how much alcohol you drink. Moderate amounts of alcohol (up to 2 drinks a day for men, 1 drink a day for women) are okay. But drinking too much can lead to liver problems, high blood pressure, and other health problems. Family health  If you have a family, there are many things you can do together to improve your health. · Eat meals together as a family as often as possible. · Eat healthy foods. This includes fruits, vegetables, lean meats and dairy, and whole grains. · Include your family in your fitness plan. Most people think of activities such as jogging or tennis as the way to fitness, but there are many ways you and your family can be more active. Anything that makes you breathe hard and gets your heart pumping is exercise. Here are some tips:  ? Walk to do errands or to take your child to school or the bus.  ? Go for a family bike ride after dinner instead of watching TV. Where can you learn more? Go to http://mai-marlena.info/. Enter P945 in the search box to learn more about \"A Healthy Lifestyle: Care Instructions. \"  Current as of: September 11, 2018  Content Version: 11.9  © 0910-4160 [employer]. Care instructions adapted under license by [employer] (which disclaims liability or warranty for this information). If you have questions about a medical condition or this instruction, always ask your healthcare professional. [place] [address] any warranty or liability for your use of this information.       [facility]  [address] [place] [address] [address],   [place], [facility] [address]  [phone]

## 2019-02-07 NOTE — PROGRESS NOTES
Chief Complaint   Patient presents with    Behavioral Problem     Patient is not fasting. Patient would like to discuss Chantix and Depression medication. Patient in room # 5.       1. Have you been to the ER, urgent care clinic since your last visit? Hospitalized since your last visit? No    2. Have you seen or consulted any other health care providers outside of the [address] [place] [address] since your last visit? Include any pap smears or colon screening. No    HM Reviewed. Flowsheet, Learning needs completed, abuse and phq completed.  Phq+  PHQ over the last two weeks 2/7/2019   Little interest or pleasure in doing things Nearly every day   Feeling down, depressed, irritable, or hopeless Nearly every day   Total Score PHQ 2 6   Trouble falling or staying asleep, or sleeping too much Nearly every day   Feeling tired or having little energy Nearly every day   Poor appetite, weight loss, or overeating Several days   Feeling bad about yourself - or that you are a failure or have let yourself or your family down Nearly every day   Trouble concentrating on things such as school, work, reading, or watching TV Nearly every day   Moving or speaking so slowly that other people could have noticed; or the opposite being so fidgety that others notice Not at all   Thoughts of being better off dead, or hurting yourself in some way Not at all   PHQ 9 Score 19   How difficult have these problems made it for you to do your work, take care of your home and get along with others Extremely difficult

## 2019-02-07 NOTE — TELEPHONE ENCOUNTER
Called home and cell number. Verified name and  Notified of appointment need. Appointment offered and accepted for today. Patient had no further questions or concerns.

## 2019-02-07 NOTE — TELEPHONE ENCOUNTER
Unfortunately I cannot refill the Adderall without an appointment. Maybe she can move her visit up?  I have a 2:00 open today

## 2019-02-07 NOTE — PROGRESS NOTES
Patient: Elizabeth Croft MRN: [id number]  SSN: [id number]    YOB: 1980  Age: 41 y.o. Sex: female      Date of Service: 2/7/2019   Provider: CHRISTAL Travis   Office Location:   [address] [place]on pérez, [address]  Office Phone: [phone]  Office Fax: [phone]      REASON FOR VISIT:   Chief Complaint   Patient presents with    Behavioral Problem        VITALS:   Visit Vitals  /78 (BP 1 Location: Right arm, BP Patient Position: Sitting) Comment: manual   Pulse 65   Temp 98.7 °F (37.1 °C) (Oral)   Resp 12   Ht 5' 11\" (1.803 m)   Wt 191 lb 12.8 oz (87 kg)   LMP 02/01/2019   SpO2 95%   BMI 26.75 kg/m²        MEDICATIONS:   Current Outpatient Medications on File Prior to Visit   Medication Sig Dispense Refill    melatonin tab tablet Take  by mouth nightly. No current facility-administered medications on file prior to visit. ALLERGIES:   No Known Allergies     MEDICAL/SURGICAL HISTORY:  Past Medical History:   Diagnosis Date    Depression     Headache     Knee joint pain       Past Surgical History:   Procedure Laterality Date    HX BREAST REDUCTION Bilateral         FAMILY HISTORY:  Family History   Problem Relation Age of Onset    Depression Mother     Anxiety Mother     Hypertension Mother     Stroke Mother     Cancer Father         skin, prostate    Depression Sister         \"manic depression\"    Alzheimer Maternal Grandmother     Stroke Maternal Grandmother     Cancer Other         SOCIAL HISTORY:  Social History     Tobacco Use    Smoking status: Current Every Day Smoker     Packs/day: 1.00     Types: Cigarettes    Smokeless tobacco: Never Used   Substance Use Topics    Alcohol use: No    Drug use: No             HISTORY OF PRESENT ILLNESS: Elizabeth Croft is a 41 y.o. female who presents to the office for a routine follow up visit. ADHD -  Since adolescence.  Patient reports symptoms have been well controlled on Adderall 20 mg TID. She had recently been following with a psychiatrist in [place] for med management, but it eventually became too expensive, as she is self-pay. She feels this dose is effective and denies adverse side effects.      Insomnia -   Patient was referred to sleep medicine at her last visit, but has not yet scheduled an appointment. We had tried her on Seroquel 50 mg qhs which was working well for sleep, but seemed to be exacerbating depression symptoms (see below) so she discontinued this medication about a week ago. Depression -   Patient reports she has gone through bouts of depression in the past and has been feeling increasingly depressed lately due to some situational stressors with tax season approaching. She reports feeling little motivation to get out of bed most mornings.  In hindsight, she does think this was exacerbated by the Seroquel she was taking for sleep, but she is interested in getting back on an antidepressant, as she has taken several in the past    PHQ as follows:    PHQ over the last two weeks 2/7/2019   Little interest or pleasure in doing things Nearly every day   Feeling down, depressed, irritable, or hopeless Nearly every day   Total Score PHQ 2 6   Trouble falling or staying asleep, or sleeping too much Nearly every day   Feeling tired or having little energy Nearly every day   Poor appetite, weight loss, or overeating Several days   Feeling bad about yourself - or that you are a failure or have let yourself or your family down Nearly every day   Trouble concentrating on things such as school, work, reading, or watching TV Nearly every day   Moving or speaking so slowly that other people could have noticed; or the opposite being so fidgety that others notice Not at all   Thoughts of being better off dead, or hurting yourself in some way Not at all   PHQ 9 Score 19   How difficult have these problems made it for you to do your work, take care of your home and get along with others Extremely difficult          REVIEW OF SYSTEMS:  ROS not performed    PHYSICAL EXAMINATION:  Physical Exam   Constitutional: She is well-developed, well-nourished, and in no distress. Cardiovascular: Normal rate, regular rhythm and normal heart sounds. Pulmonary/Chest: Effort normal and breath sounds normal. No respiratory distress. She has no wheezes. Skin: Skin is warm and dry. Psychiatric: Mood, memory and affect normal.        RESULTS:  No results found for this visit on 02/07/19. ASSESSMENT/PLAN:  Diagnoses and all orders for this visit:    1. Attention deficit hyperactivity disorder (ADHD), unspecified ADHD type   - Stable, Adderall refilled  Orders:    -     dextroamphetamine-amphetamine (ADDERALL) 20 mg tablet; Take 1 Tab (20 mg total) by mouth three (3) times dailyEarliest Fill Date: 4/7/19. Max Daily Amount: 60 mg    2. Primary insomnia  - I have encouraged the patient to consider following up with psychiatry for ongoing management of her ADHD, depression, and severe insomnia. She is presently without insurance, but hopes to have a job with benefits in the near future  - I explained that her situation is complex given that she has co-morbied ADHD, anxiety, depression, etc. and would really prefer not to keep \"experimenting\" with different medications, especially as she has a family hx of bipolar disorder  - She was provided with the contact information for some local mental health providers and will look into self-pay pricing options in the meantime     3. Recurrent depression ([facility] [address]  4. Tobacco abuse  - Will trial Wellbutrin in hopes that this might also help with her smoking cessation efforts  Orders:    -     buPROPion SR (WELLBUTRIN SR) 150 mg SR tablet; Take 1 Tab by mouth two (2) times a day. Follow-up Disposition:  Return in about 3 months (around 5/7/2019) for ADHD. All questions answered.  Patient expresses understanding and agrees with the plan as detailed above. A total of 20 minutes was spent with the patient, of which >50% was spent in counseling/coordinating care regarding anxiety, depression, insomnia management.       PATIENT CARE TEAM:   Patient Care Team:  CHRISTAL Olivares as PCP - General (Physician Assistant)       CHRISTAL Rivas   February 7, 2019   12:45 PM

## 2019-02-07 NOTE — TELEPHONE ENCOUNTER
Refill request for Adderall 20mg. Last refill on 1/05/2019 #90 w 0  refill. Last office visit 11/15/2018. Next appointment on 02/13/2019. Requested pharmacy [facility]. Refill request sent to provider for approval or denial.     Xeround message sent to patient to keep appointment on 02/13/2019.

## 2019-03-10 DIAGNOSIS — Z72.0 TOBACCO ABUSE: ICD-10-CM

## 2019-03-10 DIAGNOSIS — F33.9 RECURRENT DEPRESSION (HCC): ICD-10-CM

## 2019-03-11 RX ORDER — BUPROPION HYDROCHLORIDE 150 MG/1
150 TABLET, EXTENDED RELEASE ORAL 2 TIMES DAILY
Qty: 60 TAB | Refills: 0 | Status: SHIPPED | OUTPATIENT
Start: 2019-03-11 | End: 2019-05-09

## 2019-04-15 ENCOUNTER — TELEPHONE (OUTPATIENT)
Dept: FAMILY MEDICINE CLINIC | Age: 39
End: 2019-04-15

## 2019-04-15 DIAGNOSIS — F33.9 RECURRENT DEPRESSION (HCC): ICD-10-CM

## 2019-04-15 DIAGNOSIS — Z72.0 TOBACCO ABUSE: ICD-10-CM

## 2019-04-15 DIAGNOSIS — F90.9 ATTENTION DEFICIT HYPERACTIVITY DISORDER (ADHD), UNSPECIFIED ADHD TYPE: ICD-10-CM

## 2019-04-15 RX ORDER — BUPROPION HYDROCHLORIDE 150 MG/1
150 TABLET, EXTENDED RELEASE ORAL 2 TIMES DAILY
Qty: 60 TAB | Refills: 0 | Status: CANCELLED | OUTPATIENT
Start: 2019-04-15

## 2019-04-15 NOTE — TELEPHONE ENCOUNTER
Yes, the Seroquel was discontinued at her 2/7 appt because she stated that while it was helping her sleep, she felt it was making her depression symptoms worse. Will hold off on refill for now, as this may have simply been on auto-refill at the pharmacy. Do need clarification from patient as to whom she will be seeing for primary care going forward. OhLifehart messages seem to indicate that she wants to go back to Timothy Summers at [facility]. I am happy to send refills/bridge meds until she can be seen there, but she should have enough of the Adderall and Wellbutrin to last until early May.

## 2019-04-15 NOTE — TELEPHONE ENCOUNTER
This patient contacted office for the following prescriptions to be filled:    Medication requested :   Requested Prescriptions     Pending Prescriptions Disp Refills    dextroamphetamine-amphetamine (ADDERALL) 20 mg tablet 90 Tab 0     Sig: Take 1 Tab by mouth three (3) times daily. Max Daily Amount: 60 mg.    buPROPion SR (WELLBUTRIN SR) 150 mg SR tablet 60 Tab 0     Sig: Take 1 Tab by mouth two (2) times a day. PCP: Earline Reyes or Print: pharmacy  Mail order or Local pharmacy: [facility]: 2/7/19 with Alfonso  Per patient's Market[employer]ht message she would like to see Ildefonso Rhodes for primary care.

## 2019-04-15 NOTE — TELEPHONE ENCOUNTER
Last OV: 02-  Last labs: N/A  Next OV and labs: 05- canceled     Left message for Rohit Lewis to call [facility] on clarification on Seroquel 50 mg and [facility] whom stated that medication was last filled on 01- (By Dr. Heather Black) and discontinued on 02- (By Kam Bermeo due to side effects).  Wellbutrin SR given

## 2019-04-15 NOTE — TELEPHONE ENCOUNTER
This pharmacy faxed over request for the following prescriptions to be filled:    Medication requested : QUEtiapine (SEROQUEL) 50 mg tablet        PCP: [address] or [facility]   Mail order or Local pharmacy [facility] [address]    Scheduled appointment if not seen by current providers in office: LOV 2/7/2019 No f/u up Scheduled at this time. Pt canceled appt on 5/1/2019 and requested an appt with RADHA Mckeon at [facility]. LMOV for pt to call the office.

## 2019-04-17 NOTE — TELEPHONE ENCOUNTER
Patient's response to Monitor Backlinkst message is that she will be seeing Thais Haywood for primary care.

## 2019-04-19 ENCOUNTER — OFFICE VISIT (OUTPATIENT)
Dept: FAMILY MEDICINE CLINIC | Age: 39
End: 2019-04-19

## 2019-04-19 VITALS
TEMPERATURE: 97.8 F | BODY MASS INDEX: 27.58 KG/M2 | OXYGEN SATURATION: 96 % | HEART RATE: 67 BPM | SYSTOLIC BLOOD PRESSURE: 110 MMHG | RESPIRATION RATE: 18 BRPM | WEIGHT: 197 LBS | HEIGHT: 71 IN | DIASTOLIC BLOOD PRESSURE: 85 MMHG

## 2019-04-19 DIAGNOSIS — G47.00 INSOMNIA, UNSPECIFIED TYPE: ICD-10-CM

## 2019-04-19 DIAGNOSIS — F33.9 RECURRENT DEPRESSION (HCC): ICD-10-CM

## 2019-04-19 DIAGNOSIS — F90.9 ATTENTION DEFICIT HYPERACTIVITY DISORDER (ADHD), UNSPECIFIED ADHD TYPE: Primary | ICD-10-CM

## 2019-04-19 RX ORDER — DEXTROAMPHETAMINE SACCHARATE, AMPHETAMINE ASPARTATE MONOHYDRATE, DEXTROAMPHETAMINE SULFATE AND AMPHETAMINE SULFATE 5; 5; 5; 5 MG/1; MG/1; MG/1; MG/1
20 CAPSULE, EXTENDED RELEASE ORAL
Qty: 30 CAP | Refills: 0 | Status: SHIPPED | OUTPATIENT
Start: 2019-04-19 | End: 2019-05-09 | Stop reason: SDUPTHER

## 2019-04-19 RX ORDER — BUPROPION HYDROCHLORIDE 150 MG/1
150 TABLET, EXTENDED RELEASE ORAL 2 TIMES DAILY
Qty: 60 TAB | Refills: 0 | OUTPATIENT
Start: 2019-04-19

## 2019-04-19 RX ORDER — AMITRIPTYLINE HYDROCHLORIDE 25 MG/1
25 TABLET, FILM COATED ORAL
Qty: 30 TAB | Refills: 1 | Status: SHIPPED | OUTPATIENT
Start: 2019-04-19 | End: 2019-05-09

## 2019-04-19 RX ORDER — DEXTROAMPHETAMINE SACCHARATE, AMPHETAMINE ASPARTATE, DEXTROAMPHETAMINE SULFATE AND AMPHETAMINE SULFATE 5; 5; 5; 5 MG/1; MG/1; MG/1; MG/1
20 TABLET ORAL 3 TIMES DAILY
Qty: 90 TAB | Refills: 0 | OUTPATIENT
Start: 2019-04-19

## 2019-04-19 NOTE — PATIENT INSTRUCTIONS

## 2019-04-19 NOTE — PROGRESS NOTES
Patient is in the office today for medication evaluation. 1. Have you been to the ER, urgent care clinic since your last visit? Hospitalized since your last visit? No    2. Have you seen or consulted any other health care providers outside of the [address] [place] [address] since your last visit? Include any pap smears or colon screening.  No

## 2019-04-19 NOTE — PROGRESS NOTES
HISTORY OF PRESENT ILLNESS  Mc Coronado is a 41 y.o. female. HPI  Mc Coronado is a 41 y.o. female who presents to the office today for med evaluation. She comes in to discuss her ADHD and insomnia. ADHD- She has been taking adderall 20mg TID. She would like to discuss cutting down to BID dosing. She used to take ER Adderall about 7 years ago but felt this gave her anxiety and chest pains. However she was going through a lot of stressful times at work, so this may also have been caused by anxiety. She usually takes her doses at 5am, 12pm, and around 4pm.   She is c/o insomnia. This has been a long term problem. She used to take ambien CR 12.5mg. This helped her sleep straight through the night. She does drink [employer] drinks every day. She says she needs it because she cannot sleep. She has tried trazodone in the past and did not like how this made her feel. Belsomra did not work. She will take naps during the day. Says she has been through sleep studies in the past.   Depression- she was started on wellbutrin in February after being placed on seroquel which exacerbated her depressive symptoms. She states it is not really helping much. She is still feeling depressed but she thinks this is because she cannot sleep well at night and feels overly tired during the day. She also gained weight since injuring her knee which is making her feel more down. Denies S/H thoughts or ideations. She was supposed to have a PAP today but just started her cycle this morning. Will reschedule PAP. Chief Complaint   Patient presents with    Medication Evaluation    Behavioral Problem   . Current Outpatient Medications on File Prior to Visit   Medication Sig Dispense Refill    buPROPion SR (WELLBUTRIN SR) 150 mg SR tablet Take 1 Tab by mouth two (2) times a day. 60 Tab 0    melatonin tab tablet Take  by mouth nightly. No current facility-administered medications on file prior to visit.       No Known Allergies  Past Medical History:   Diagnosis Date    Depression     Headache     Knee joint pain      Social History     Tobacco Use   Smoking Status Current Every Day Smoker    Packs/day: 1.00    Types: Cigarettes   Smokeless Tobacco Never Used     Social History     Substance and Sexual Activity   Alcohol Use No     Family History   Problem Relation Age of Onset    Depression Mother     Anxiety Mother     Hypertension Mother     Stroke Mother     Cancer Father         skin, prostate    Depression Sister         \"manic depression\"    Alzheimer Maternal Grandmother     Stroke Maternal Grandmother     Cancer Other          Review of Systems   Constitutional: Positive for malaise/fatigue. Negative for fever and weight loss. Eyes: Negative for blurred vision. Cardiovascular: Negative for chest pain and palpitations. Gastrointestinal: Negative for nausea and vomiting. Neurological: Negative for dizziness and headaches. Endo/Heme/Allergies: Does not bruise/bleed easily. Psychiatric/Behavioral: Positive for depression. Negative for hallucinations, memory loss, substance abuse and suicidal ideas. The patient is nervous/anxious and has insomnia. Visit Vitals  /85 (BP 1 Location: Left arm, BP Patient Position: Sitting)   Pulse 67   Temp 97.8 °F (36.6 °C) (Oral)   Resp 18   Ht 5' 11\" (1.803 m)   Wt 197 lb (89.4 kg)   SpO2 96%   BMI 27.48 kg/m²     Physical Exam   Constitutional: She is oriented to person, place, and time. She appears well-developed and well-nourished. No distress. Cardiovascular: Normal rate and regular rhythm. Pulmonary/Chest: Effort normal and breath sounds normal.   Neurological: She is alert and oriented to person, place, and time. Gait normal.   Skin: Skin is warm and dry. Psychiatric: She has a normal mood and affect. Her speech is normal and behavior is normal. Judgment and thought content normal.   Nursing note and vitals reviewed.       ASSESSMENT and PLAN ICD-10-CM ICD-9-CM    1. Attention deficit hyperactivity disorder (ADHD), unspecified ADHD type F90.9 314.01 amphetamine-dextroamphetamine XR (ADDERALL XR) 20 mg XR capsule   2. Recurrent depression (HCC) F33.9 296.30 amitriptyline (ELAVIL) 25 mg tablet   3. Insomnia, unspecified type G47.00 780.52 amitriptyline (ELAVIL) 25 mg tablet   We had a long discussion regarding her current issues and concerns with medication. She has agreed to cut down on adderall. I explained that she is complaining of insomnia, yet she is taking adderall Three times daily and drinking [employer] energy drinks. No wonder she is having issues with sleep. Changing her ADHD regimen to Adderall XR 20mg in the morning and Adderall IR 20mg at lunch time. Reviewed . She should have enough IR adderall to last for a while. She was only given a rx of Adderall XR caps. She will taper off wellbutrin since this is not helping with depression. Given instructions in the office on how to taper down. In the meantime, will introduce Elavil for both depression and insomnia. Will see her in one month to titrate this dose. She will cut back significantly, and ideally cut out completely, all drinks that contain caffeine. No napping during the day. Reschedule for PAP smear and labs. Reviewed medication and side effects. Patient agrees with the plan and verbalizes understanding. I have spent over 25 minutes in face to face time with this pt in discussion with respect to the aforementioned problems, diagnoses and management plans. >50% of the time was spent counseling and coordinating care. Follow-up and Dispositions    · Return in about 1 month (around 5/19/2019) for ADHD, depression.        Choco Armijo PA-C  4/19/2019

## 2019-05-09 ENCOUNTER — OFFICE VISIT (OUTPATIENT)
Dept: FAMILY MEDICINE CLINIC | Age: 39
End: 2019-05-09

## 2019-05-09 ENCOUNTER — HOSPITAL ENCOUNTER (OUTPATIENT)
Dept: LAB | Age: 39
Discharge: HOME | End: 2019-05-09
Payer: SELF-PAY

## 2019-05-09 VITALS
WEIGHT: 193 LBS | OXYGEN SATURATION: 96 % | HEART RATE: 82 BPM | DIASTOLIC BLOOD PRESSURE: 73 MMHG | BODY MASS INDEX: 27.02 KG/M2 | HEIGHT: 71 IN | TEMPERATURE: 97.7 F | RESPIRATION RATE: 18 BRPM | SYSTOLIC BLOOD PRESSURE: 134 MMHG

## 2019-05-09 DIAGNOSIS — Z12.4 SCREENING FOR CERVICAL CANCER: ICD-10-CM

## 2019-05-09 DIAGNOSIS — Z01.419 WELL WOMAN EXAM: Primary | ICD-10-CM

## 2019-05-09 DIAGNOSIS — G47.00 INSOMNIA, UNSPECIFIED TYPE: ICD-10-CM

## 2019-05-09 DIAGNOSIS — F90.9 ATTENTION DEFICIT HYPERACTIVITY DISORDER (ADHD), UNSPECIFIED ADHD TYPE: ICD-10-CM

## 2019-05-09 PROCEDURE — 88175 CYTOPATH C/V AUTO FLUID REDO: CPT

## 2019-05-09 PROCEDURE — 87624 HPV HI-RISK TYP POOLED RSLT: CPT

## 2019-05-09 RX ORDER — DEXTROAMPHETAMINE SACCHARATE, AMPHETAMINE ASPARTATE MONOHYDRATE, DEXTROAMPHETAMINE SULFATE AND AMPHETAMINE SULFATE 2.5; 2.5; 2.5; 2.5 MG/1; MG/1; MG/1; MG/1
CAPSULE, EXTENDED RELEASE ORAL
Qty: 30 CAP | Refills: 0 | Status: SHIPPED | OUTPATIENT
Start: 2019-05-09 | End: 2019-05-31 | Stop reason: DRUGHIGH

## 2019-05-09 RX ORDER — DEXTROAMPHETAMINE SACCHARATE, AMPHETAMINE ASPARTATE MONOHYDRATE, DEXTROAMPHETAMINE SULFATE AND AMPHETAMINE SULFATE 5; 5; 5; 5 MG/1; MG/1; MG/1; MG/1
CAPSULE, EXTENDED RELEASE ORAL
Qty: 30 CAP | Refills: 0 | Status: SHIPPED | OUTPATIENT
Start: 2019-05-09 | End: 2019-05-31 | Stop reason: DRUGHIGH

## 2019-05-09 RX ORDER — CLONIDINE HYDROCHLORIDE 0.1 MG/1
0.1 TABLET ORAL
Qty: 30 TAB | Refills: 2 | Status: SHIPPED | OUTPATIENT
Start: 2019-05-09 | End: 2019-07-25 | Stop reason: SDUPTHER

## 2019-05-09 NOTE — PROGRESS NOTES
Laura Rai is a 41 y.o. female here for well woman exam/PAP, also has a concerning mole/skin tag on face.

## 2019-05-09 NOTE — PROGRESS NOTES
Subjective:   41 y.o. female for Well Woman Check. No LMP recorded. Social History: not sexually active. Patient Active Problem List   Diagnosis Code    Attention deficit hyperactivity disorder (ADHD) F90.9    Primary insomnia F51.01     Patient Active Problem List    Diagnosis Date Noted    Attention deficit hyperactivity disorder (ADHD) 10/04/2018    Primary insomnia 10/04/2018     Current Outpatient Medications   Medication Sig Dispense Refill    amphetamine-dextroamphetamine XR (ADDERALL XR) 20 mg XR capsule Take 1 cap every morning with 10mg XR capsule. Max daily dose 30mg 30 Cap 0    amphetamine-dextroamphetamine XR (ADDERALL XR) 10 mg XR capsule Take 1 cap every morning with 20mg XR capsule. Max daily 30mg 30 Cap 0    cloNIDine HCl (CATAPRES) 0.1 mg tablet Take 1 Tab by mouth nightly. Indications: insomnia 30 Tab 2    melatonin tab tablet Take  by mouth nightly. No Known Allergies  Past Medical History:   Diagnosis Date    Depression     Headache     Knee joint pain      Past Surgical History:   Procedure Laterality Date    HX BREAST REDUCTION Bilateral      Family History   Problem Relation Age of Onset    Depression Mother     Anxiety Mother     Hypertension Mother     Stroke Mother     Cancer Father         skin, prostate    Depression Sister         \"manic depression\"    Alzheimer Maternal Grandmother     Stroke Maternal Grandmother     Cancer Other      Social History     Tobacco Use    Smoking status: Current Every Day Smoker     Packs/day: 1.00     Types: Cigarettes    Smokeless tobacco: Never Used   Substance Use Topics    Alcohol use: No        ROS:  Feeling well. No dyspnea or chest pain on exertion. No abdominal pain, change in bowel habits, black or bloody stools. No urinary tract symptoms. GYN ROS: normal menses, no abnormal bleeding, pelvic pain or discharge, no breast pain or new or enlarging lumps on self exam. No neurological complaints. Objective:     Visit Vitals  /73 (BP 1 Location: Left arm, BP Patient Position: Sitting)   Pulse 82   Temp 97.7 °F (36.5 °C) (Oral)   Resp 18   Ht 5' 11\" (1.803 m)   Wt 193 lb (87.5 kg)   SpO2 96%   BMI 26.92 kg/m²     The patient appears well, alert, oriented x 3, in no distress. Neck supple. No adenopathy or thyromegaly. Lungs are clear, good air entry, no wheezes, rhonchi or rales. S1 and S2 normal, no murmurs, regular rate and rhythm. Abdomen soft without tenderness, guarding, mass or organomegaly. Extremities show no edema, normal peripheral pulses. Neurological is normal, no focal findings. BREAST EXAM: bilateral implants, no palpable abnormalities otherwise    PELVIC EXAM: normal external genitalia, vulva, vagina, cervix, uterus and adnexa, PAP: Pap smear done today, exam chaperoned by YOON Mendoza    Assessment/Plan:       ICD-10-CM ICD-9-CM    1. Well woman exam Z01.419 V72.31 PAP IG, APTIMA HPV AND RFX 16/18,45 ([id number])   2. Screening for cervical cancer Z12.4 V76.2 PAP IG, APTIMA HPV AND RFX 16/18,45 ([id number])   3. Insomnia, unspecified type G47.00 780.52 cloNIDine HCl (CATAPRES) 0.1 mg tablet   4. Attention deficit hyperactivity disorder (ADHD), unspecified ADHD type F90.9 314.01 amphetamine-dextroamphetamine XR (ADDERALL XR) 20 mg XR capsule      amphetamine-dextroamphetamine XR (ADDERALL XR) 10 mg XR capsule     PAP smear done today. Will call with results  She has stopped wellbutrin and Elavil. Will only use clonidine as needed for sleep  ADHD- Will try a regimen of adderall XR 30mg in the morning and if needed can take adderall IR 20mg in the early afternoon. Hoping this will help improve her sleep issues by cutting her back from her initial dose of adderall 20mg TID. I reviewed her . She should have enough immediate release adderall left. She was only given a rx for the XR capsules. Reviewed medication and side effects. Patient agrees with plan and verbalizes understanding. Follow-up and Dispositions    · Return in about 1 month (around 6/9/2019) for ADHD.          Geraldine Kelly PA-C  05/09/19

## 2019-05-31 DIAGNOSIS — F90.9 ATTENTION DEFICIT HYPERACTIVITY DISORDER (ADHD), UNSPECIFIED ADHD TYPE: Primary | ICD-10-CM

## 2019-05-31 RX ORDER — DEXTROAMPHETAMINE SACCHARATE, AMPHETAMINE ASPARTATE, DEXTROAMPHETAMINE SULFATE AND AMPHETAMINE SULFATE 5; 5; 5; 5 MG/1; MG/1; MG/1; MG/1
20 TABLET ORAL 3 TIMES DAILY
Qty: 90 TAB | Refills: 0 | Status: SHIPPED | OUTPATIENT
Start: 2019-05-31 | End: 2019-05-31 | Stop reason: SDUPTHER

## 2019-05-31 RX ORDER — DEXTROAMPHETAMINE SACCHARATE, AMPHETAMINE ASPARTATE, DEXTROAMPHETAMINE SULFATE AND AMPHETAMINE SULFATE 5; 5; 5; 5 MG/1; MG/1; MG/1; MG/1
20 TABLET ORAL 3 TIMES DAILY
Qty: 90 TAB | Refills: 0 | Status: SHIPPED | OUTPATIENT
Start: 2019-06-28 | End: 2019-07-25 | Stop reason: SDUPTHER

## 2020-04-07 ENCOUNTER — VIRTUAL VISIT (OUTPATIENT)
Dept: FAMILY MEDICINE CLINIC | Age: 40
End: 2020-04-07

## 2020-04-07 ENCOUNTER — TELEPHONE (OUTPATIENT)
Dept: FAMILY MEDICINE CLINIC | Age: 40
End: 2020-04-07

## 2020-04-07 DIAGNOSIS — F90.9 ATTENTION DEFICIT HYPERACTIVITY DISORDER (ADHD), UNSPECIFIED ADHD TYPE: ICD-10-CM

## 2020-04-07 NOTE — TELEPHONE ENCOUNTER
Please inform patient that \"although our practice understands that unfortunate position that your in, since we have no documentation to permit lawful prescribing of these controlled substances at this time even in light of relaxed regulations due to the [place] pandemic, we are not able to assist her with her ongoing medication management. \" 
 
Also, Dr. Simone Gordon, the most recent prescriber according to the , is doing telephone visits during the pandemic per his office's voicemail, which I called. ----------------------------------- Reviewed chart and . No active controlled substances agreement on file.  shows that psychiatry has been prescribing since Aug 2019. Last PDMP Yulia Brown as Reviewed: 
Review User Review Instant Review Result Katheryn Norman 4/7/2020  3:39 PM Reviewed PDMP [1]

## 2020-04-07 NOTE — TELEPHONE ENCOUNTER
This patient has a VV appointment with Dr. Ori Harding and he is not comfortable seeing her because of her medications. I tried to call her prior to the appointment time. She did not answer and know she wants to see someone for her refills. She states that her psychiatrist office is closed. Please advise

## 2020-04-07 NOTE — TELEPHONE ENCOUNTER
Called and left message to call back.  She is scheduled with Dr Bertha Baez today for a VV and I was calling to inform her that he does not prescribe Adderall or diazepam.

## 2020-04-07 NOTE — TELEPHONE ENCOUNTER
Pt is a former [facility] patient and she was seen virtually by Dr Bren Ulloa today however he is not going to refill her controlled substance medication and she wants to know who she can schedule with or who would be willing to write her the medication. She does have a psychiatrist that she has been seeing for a year however they are currently closed due to covid. She is on Adderall, Seroquel and Valium. She does not have anymore and she is concerned abut withdrawal symptoms. Please advise if there is anything we can do for her.

## 2020-04-07 NOTE — PROGRESS NOTES
A user error has taken place: encounter opened in error, closed for administrative reasons. Patient was never seen by Provider.